# Patient Record
Sex: FEMALE | Race: WHITE | NOT HISPANIC OR LATINO | Employment: OTHER | URBAN - METROPOLITAN AREA
[De-identification: names, ages, dates, MRNs, and addresses within clinical notes are randomized per-mention and may not be internally consistent; named-entity substitution may affect disease eponyms.]

---

## 2020-05-26 ASSESSMENT — VISUAL ACUITY
OS_SC: 20/30
OD_CC: J1+
OS_CC: 20/20
OD_CC: 20/25
OS_SC: J7
OS_CC: J1+
OD_SC: J7
OD_SC: 20/50

## 2020-05-26 ASSESSMENT — TONOMETRY
OD_IOP_MMHG: 12
OS_IOP_MMHG: 13

## 2020-06-11 ENCOUNTER — ESTABLISHED PATIENT (OUTPATIENT)
Dept: URBAN - METROPOLITAN AREA CLINIC 32 | Facility: CLINIC | Age: 65
End: 2020-06-11

## 2020-06-11 DIAGNOSIS — Z96.1: ICD-10-CM

## 2020-06-11 DIAGNOSIS — H40.013: ICD-10-CM

## 2020-06-11 DIAGNOSIS — H26.493: ICD-10-CM

## 2020-06-11 PROCEDURE — 92014 COMPRE OPH EXAM EST PT 1/>: CPT

## 2020-06-11 PROCEDURE — 92310-1 LEVEL 1 CONTACT LENS MANAGEMENT

## 2020-06-11 PROCEDURE — 92015 DETERMINE REFRACTIVE STATE: CPT

## 2020-06-11 PROCEDURE — 92133 CPTRZD OPH DX IMG PST SGM ON: CPT

## 2020-06-11 ASSESSMENT — KERATOMETRY
OS_K1POWER_DIOPTERS: 43
OS_K2POWER_DIOPTERS: 43.75
OD_K1POWER_DIOPTERS: 42
OS_AXISANGLE2_DEGREES: 60
OD_AXISANGLE2_DEGREES: 88
OD_K2POWER_DIOPTERS: 42.75
OS_AXISANGLE_DEGREES: 150
OD_AXISANGLE_DEGREES: 178

## 2020-07-24 ENCOUNTER — OFFICE VISIT (OUTPATIENT)
Dept: URBAN - METROPOLITAN AREA CLINIC 68 | Facility: CLINIC | Age: 65
End: 2020-07-24

## 2020-07-28 ENCOUNTER — TELEPHONE ENCOUNTER (OUTPATIENT)
Dept: URBAN - METROPOLITAN AREA CLINIC 68 | Facility: CLINIC | Age: 65
End: 2020-07-28

## 2020-07-29 ENCOUNTER — TELEPHONE ENCOUNTER (OUTPATIENT)
Dept: URBAN - METROPOLITAN AREA CLINIC 68 | Facility: CLINIC | Age: 65
End: 2020-07-29

## 2020-08-24 ENCOUNTER — GLASSES RECHECK (OUTPATIENT)
Dept: URBAN - METROPOLITAN AREA CLINIC 32 | Facility: CLINIC | Age: 65
End: 2020-08-24

## 2020-08-24 ENCOUNTER — OFFICE VISIT (OUTPATIENT)
Dept: URBAN - METROPOLITAN AREA SURGERY CENTER 12 | Facility: SURGERY CENTER | Age: 65
End: 2020-08-24

## 2020-08-24 DIAGNOSIS — H40.013: ICD-10-CM

## 2020-08-24 PROCEDURE — 92015NC REFRACTION NO CHARGE

## 2020-08-24 ASSESSMENT — KERATOMETRY
OD_AXISANGLE2_DEGREES: 88
OS_K2POWER_DIOPTERS: 43.75
OS_AXISANGLE_DEGREES: 150
OD_K2POWER_DIOPTERS: 42.75
OD_K1POWER_DIOPTERS: 42
OD_AXISANGLE_DEGREES: 178
OS_AXISANGLE2_DEGREES: 60
OS_K1POWER_DIOPTERS: 43

## 2020-08-24 ASSESSMENT — VISUAL ACUITY
OS_SC: 20/30+2
OS_SC: J7-1
OD_SC: J5-1
OD_CC: J2+2
OS_CC: J1
OD_SC: 20/30+1

## 2020-08-25 ENCOUNTER — LAB OUTSIDE AN ENCOUNTER (OUTPATIENT)
Dept: URBAN - METROPOLITAN AREA CLINIC 68 | Facility: CLINIC | Age: 65
End: 2020-08-25

## 2020-08-25 ENCOUNTER — TELEPHONE ENCOUNTER (OUTPATIENT)
Dept: URBAN - METROPOLITAN AREA CLINIC 68 | Facility: CLINIC | Age: 65
End: 2020-08-25

## 2020-08-25 LAB
01: (no result)
01: (no result)

## 2020-09-21 ENCOUNTER — TELEPHONE ENCOUNTER (OUTPATIENT)
Dept: URBAN - METROPOLITAN AREA CLINIC 68 | Facility: CLINIC | Age: 65
End: 2020-09-21

## 2020-12-07 ENCOUNTER — EST. PATIENT EMERGENCY (OUTPATIENT)
Dept: URBAN - METROPOLITAN AREA CLINIC 32 | Facility: CLINIC | Age: 65
End: 2020-12-07

## 2020-12-07 DIAGNOSIS — H04.123: ICD-10-CM

## 2020-12-07 PROCEDURE — 92012 INTRM OPH EXAM EST PATIENT: CPT

## 2020-12-07 ASSESSMENT — KERATOMETRY
OS_K2POWER_DIOPTERS: 43.75
OD_AXISANGLE2_DEGREES: 88
OD_K2POWER_DIOPTERS: 42.75
OS_AXISANGLE2_DEGREES: 60
OD_K1POWER_DIOPTERS: 42
OS_AXISANGLE_DEGREES: 150
OS_K1POWER_DIOPTERS: 43
OD_AXISANGLE_DEGREES: 178

## 2020-12-07 ASSESSMENT — VISUAL ACUITY
OS_CC: 20/20
OD_CC: 20/20-2

## 2020-12-07 ASSESSMENT — TONOMETRY
OD_IOP_MMHG: 13
OS_IOP_MMHG: 13

## 2020-12-14 ENCOUNTER — CONTACT LENS EXAM ESTABLISHED (OUTPATIENT)
Dept: URBAN - METROPOLITAN AREA CLINIC 32 | Facility: CLINIC | Age: 65
End: 2020-12-14

## 2020-12-14 DIAGNOSIS — H04.123: ICD-10-CM

## 2020-12-14 PROCEDURE — 92310-2 LEVEL 2 CONTACT LENS MANAGEMENT

## 2020-12-14 ASSESSMENT — KERATOMETRY
OS_K2POWER_DIOPTERS: 43.75
OD_K1POWER_DIOPTERS: 42
OD_AXISANGLE2_DEGREES: 88
OS_AXISANGLE2_DEGREES: 60
OD_AXISANGLE_DEGREES: 178
OS_AXISANGLE_DEGREES: 150
OS_K1POWER_DIOPTERS: 43
OD_K2POWER_DIOPTERS: 42.75

## 2021-07-22 ENCOUNTER — CONTACT LENS EXAM ESTABLISHED (OUTPATIENT)
Dept: URBAN - METROPOLITAN AREA CLINIC 32 | Facility: CLINIC | Age: 66
End: 2021-07-22

## 2021-07-22 DIAGNOSIS — H04.123: ICD-10-CM

## 2021-07-22 DIAGNOSIS — E10.9: ICD-10-CM

## 2021-07-22 DIAGNOSIS — H40.013: ICD-10-CM

## 2021-07-22 DIAGNOSIS — H26.493: ICD-10-CM

## 2021-07-22 DIAGNOSIS — Z96.1: ICD-10-CM

## 2021-07-22 DIAGNOSIS — Z46.0: ICD-10-CM

## 2021-07-22 PROCEDURE — 92015 DETERMINE REFRACTIVE STATE: CPT

## 2021-07-22 PROCEDURE — 92310-3 LEVEL 3 CONTACT LENS MANAGEMENT

## 2021-07-22 PROCEDURE — 92014 COMPRE OPH EXAM EST PT 1/>: CPT

## 2021-07-22 PROCEDURE — 92250 FUNDUS PHOTOGRAPHY W/I&R: CPT

## 2021-07-22 ASSESSMENT — VISUAL ACUITY
OS_SC: J7
OD_CC: J1+
OD_SC: J7
OS_SC: 20/30
OS_CC: 20/20
OS_CC: J1+
OD_SC: 20/30
OD_CC: 20/40

## 2021-07-22 ASSESSMENT — KERATOMETRY
OS_AXISANGLE_DEGREES: 150
OS_K2POWER_DIOPTERS: 43.75
OD_AXISANGLE_DEGREES: 178
OD_AXISANGLE2_DEGREES: 88
OD_K1POWER_DIOPTERS: 42
OD_K2POWER_DIOPTERS: 42.75
OS_K1POWER_DIOPTERS: 43
OS_AXISANGLE2_DEGREES: 60

## 2021-07-22 ASSESSMENT — TONOMETRY
OD_IOP_MMHG: 12
OS_IOP_MMHG: 11

## 2021-09-03 ENCOUNTER — APPOINTMENT (RX ONLY)
Dept: URBAN - METROPOLITAN AREA CLINIC 126 | Facility: CLINIC | Age: 66
Setting detail: DERMATOLOGY
End: 2021-09-03

## 2021-09-03 DIAGNOSIS — L81.4 OTHER MELANIN HYPERPIGMENTATION: ICD-10-CM

## 2021-09-03 DIAGNOSIS — L663 OTHER SPECIFIED DISEASES OF HAIR AND HAIR FOLLICLES: ICD-10-CM

## 2021-09-03 DIAGNOSIS — L73.9 FOLLICULAR DISORDER, UNSPECIFIED: ICD-10-CM

## 2021-09-03 DIAGNOSIS — D49.2 NEOPLASM OF UNSPECIFIED BEHAVIOR OF BONE, SOFT TISSUE, AND SKIN: ICD-10-CM

## 2021-09-03 DIAGNOSIS — K13.0 DISEASES OF LIPS: ICD-10-CM

## 2021-09-03 DIAGNOSIS — Z71.89 OTHER SPECIFIED COUNSELING: ICD-10-CM

## 2021-09-03 DIAGNOSIS — L57.0 ACTINIC KERATOSIS: ICD-10-CM

## 2021-09-03 DIAGNOSIS — L738 OTHER SPECIFIED DISEASES OF HAIR AND HAIR FOLLICLES: ICD-10-CM

## 2021-09-03 PROBLEM — L02.426 FURUNCLE OF LEFT LOWER LIMB: Status: ACTIVE | Noted: 2021-09-03

## 2021-09-03 PROCEDURE — ? PRESCRIPTION

## 2021-09-03 PROCEDURE — ? SHAVE REMOVAL

## 2021-09-03 PROCEDURE — 99203 OFFICE O/P NEW LOW 30 MIN: CPT | Mod: 25

## 2021-09-03 PROCEDURE — 11302 SHAVE SKIN LESION 1.1-2.0 CM: CPT

## 2021-09-03 PROCEDURE — ? COUNSELING

## 2021-09-03 PROCEDURE — ? PRESCRIPTION MEDICATION MANAGEMENT

## 2021-09-03 PROCEDURE — ? LIQUID NITROGEN

## 2021-09-03 PROCEDURE — 17000 DESTRUCT PREMALG LESION: CPT | Mod: 59

## 2021-09-03 RX ORDER — KETOCONAZOLE 20 MG/G
CREAM TOPICAL BID
Qty: 15 | Refills: 3 | Status: ERX | COMMUNITY
Start: 2021-09-03

## 2021-09-03 RX ADMIN — KETOCONAZOLE: 20 CREAM TOPICAL at 00:00

## 2021-09-03 ASSESSMENT — LOCATION ZONE DERM
LOCATION ZONE: EAR
LOCATION ZONE: LEG
LOCATION ZONE: LIP
LOCATION ZONE: TRUNK

## 2021-09-03 ASSESSMENT — LOCATION SIMPLE DESCRIPTION DERM
LOCATION SIMPLE: RIGHT EAR
LOCATION SIMPLE: UPPER BACK
LOCATION SIMPLE: LEFT PRETIBIAL REGION
LOCATION SIMPLE: RIGHT LIP
LOCATION SIMPLE: LEFT THIGH

## 2021-09-03 ASSESSMENT — LOCATION DETAILED DESCRIPTION DERM
LOCATION DETAILED: RIGHT UPPER CUTANEOUS LIP
LOCATION DETAILED: LEFT ANTERIOR PROXIMAL THIGH
LOCATION DETAILED: RIGHT SUPERIOR CRUS OF ANTIHELIX
LOCATION DETAILED: INFERIOR THORACIC SPINE
LOCATION DETAILED: LEFT DISTAL PRETIBIAL REGION
LOCATION DETAILED: LEFT PROXIMAL PRETIBIAL REGION

## 2021-09-03 NOTE — PROCEDURE: LIQUID NITROGEN
Show Applicator Variable?: Yes
Post-Care Instructions: I reviewed with the patient in detail post-care instructions. Patient is to wear sunprotection, and avoid picking at any of the treated lesions. Pt may apply Vaseline to crusted or scabbing areas.
Number Of Freeze-Thaw Cycles: 3 freeze-thaw cycles
Duration Of Freeze Thaw-Cycle (Seconds): 5
Consent: The patient's consent was obtained including but not limited to risks of crusting, scabbing, blistering, scarring, darker or lighter pigmentary change, recurrence, incomplete removal and infection.
Detail Level: Simple
Render Note In Bullet Format When Appropriate: No

## 2021-09-03 NOTE — PROCEDURE: SHAVE REMOVAL
Medical Necessity Information: It is in your best interest to select a reason for this procedure from the list below. All of these items fulfill various CMS LCD requirements except the new and changing color options.
Medical Necessity Clause: This procedure was medically necessary because the lesion that was treated was:
Lab: [facility]
Body Location Override (Optional - Billing Will Still Be Based On Selected Body Map Location If Applicable): left lateral Pretibial
Detail Level: Detailed
Was A Bandage Applied: Yes
Size Of Lesion In Cm (Required): 1.1
X Size Of Lesion In Cm (Optional): 0
Biopsy Method: Dermablade
Anesthesia Type: 1% lidocaine with epinephrine
Hemostasis: Aluminum Chloride and Electrocautery
Wound Care: Vaseline
Render Path Notes In Note?: No
Consent was obtained from the patient. The risks and benefits to therapy were discussed in detail. Specifically, the risks of infection, scarring, bleeding, prolonged wound healing, incomplete removal, allergy to anesthesia, nerve injury and recurrence were addressed. Prior to the procedure, the treatment site was clearly identified and confirmed by the patient. All components of Universal Protocol/PAUSE Rule completed.
Post-Care Instructions: I reviewed with the patient in detail post-care instructions. Patient is to keep the biopsy site dry overnight, and then apply bacitracin twice daily until healed. Patient may apply hydrogen peroxide soaks to remove any crusting.
Notification Instructions: Patient will be notified of pathology results. However, patient instructed to call the office if not contacted within 2 weeks.
Billing Type: [employer]

## 2021-09-03 NOTE — HPI: SKIN LESION
How Severe Is Your Skin Lesion?: mild
Is This A New Presentation, Or A Follow-Up?: Skin Lesion
Additional History: pt.  notes lesion has been present since February.

## 2021-09-03 NOTE — PROCEDURE: MIPS QUALITY
Detail Level: Generalized
Additional Notes: pt doesnât have exact med doses
Quality 130: Documentation Of Current Medications In The Medical Record: Documentation of a medical reason(s) for not documenting, updating, or reviewing the patientâs current medications list (e.g., patient is in an urgent or emergent medical situation)

## 2021-10-13 ENCOUNTER — OFFICE VISIT (OUTPATIENT)
Dept: URBAN - METROPOLITAN AREA CLINIC 68 | Facility: CLINIC | Age: 66
End: 2021-10-13

## 2022-06-04 ENCOUNTER — TELEPHONE ENCOUNTER (OUTPATIENT)
Dept: URBAN - METROPOLITAN AREA CLINIC 68 | Facility: CLINIC | Age: 67
End: 2022-06-04

## 2022-06-04 RX ORDER — SODIUM PICOSULFATE, MAGNESIUM OXIDE, AND ANHYDROUS CITRIC ACID 10; 3.5; 12 MG/160ML; G/160ML; G/160ML
LIQUID ORAL AS DIRECTED
Qty: 1 | Refills: 0 | OUTPATIENT
Start: 2018-08-02 | End: 2018-08-03

## 2022-06-04 RX ORDER — BUDESONIDE 3 MG/1
CAPSULE, COATED PELLETS ORAL DAILY
Qty: 90 | Refills: 0 | OUTPATIENT
Start: 2018-08-16 | End: 2018-09-15

## 2022-06-04 RX ORDER — SODIUM SULFATE, POTASSIUM SULFATE, MAGNESIUM SULFATE 17.5; 3.13; 1.6 G/ML; G/ML; G/ML
SOLUTION, CONCENTRATE ORAL AS DIRECTED
Qty: 340 | Refills: 0 | OUTPATIENT
Start: 2020-07-24 | End: 2020-07-25

## 2022-06-05 ENCOUNTER — TELEPHONE ENCOUNTER (OUTPATIENT)
Dept: URBAN - METROPOLITAN AREA CLINIC 68 | Facility: CLINIC | Age: 67
End: 2022-06-05

## 2022-06-05 RX ORDER — INSULIN LISPRO 100 [IU]/ML
HUMALOG( 100UNIT/ML SUBCUTANEOUS  DAILY ) ACTIVE -HX ENTRY INJECTION, SOLUTION INTRAVENOUS; SUBCUTANEOUS DAILY
Status: ACTIVE | COMMUNITY
Start: 2021-10-13

## 2022-06-05 RX ORDER — INSULIN GLARGINE 100 [IU]/ML
LANTUS( 100UNIT/ML SUBCUTANEOUS  DAILY ) ACTIVE -HX ENTRY INJECTION, SOLUTION SUBCUTANEOUS DAILY
Status: ACTIVE | COMMUNITY
Start: 2021-10-13

## 2022-06-05 RX ORDER — BUDESONIDE 3 MG/1
CAPSULE, COATED PELLETS ORAL DAILY
Qty: 90 | Refills: 90 | Status: ACTIVE | COMMUNITY
Start: 2020-07-24

## 2022-06-05 RX ORDER — BUDESONIDE 3 MG/1
CAPSULE, COATED PELLETS ORAL DAILY
Qty: 90 | Refills: 0 | Status: ACTIVE | COMMUNITY
Start: 2021-10-13

## 2022-06-25 ENCOUNTER — TELEPHONE ENCOUNTER (OUTPATIENT)
Age: 67
End: 2022-06-25

## 2022-06-25 RX ORDER — SODIUM PICOSULFATE, MAGNESIUM OXIDE, AND ANHYDROUS CITRIC ACID 10; 3.5; 12 MG/160ML; G/160ML; G/160ML
LIQUID ORAL AS DIRECTED
Qty: 1 | Refills: 0 | OUTPATIENT
Start: 2018-08-02 | End: 2018-08-03

## 2022-06-25 RX ORDER — BUDESONIDE 3 MG/1
CAPSULE, COATED PELLETS ORAL DAILY
Qty: 90 | Refills: 0 | OUTPATIENT
Start: 2018-08-16 | End: 2018-09-15

## 2022-06-25 RX ORDER — SODIUM SULFATE, POTASSIUM SULFATE, MAGNESIUM SULFATE 17.5; 3.13; 1.6 G/ML; G/ML; G/ML
SOLUTION, CONCENTRATE ORAL AS DIRECTED
Qty: 1 | Refills: 0 | OUTPATIENT
Start: 2016-10-12 | End: 2016-10-13

## 2022-06-25 RX ORDER — SODIUM SULFATE, POTASSIUM SULFATE, MAGNESIUM SULFATE 17.5; 3.13; 1.6 G/ML; G/ML; G/ML
SOLUTION, CONCENTRATE ORAL AS DIRECTED
Qty: 340 | Refills: 0 | OUTPATIENT
Start: 2020-07-24 | End: 2020-07-25

## 2022-06-26 ENCOUNTER — TELEPHONE ENCOUNTER (OUTPATIENT)
Age: 67
End: 2022-06-26

## 2022-06-26 RX ORDER — BUDESONIDE 3 MG/1
CAPSULE, COATED PELLETS ORAL DAILY
Qty: 90 | Refills: 90 | Status: ACTIVE | COMMUNITY
Start: 2020-07-24

## 2022-06-26 RX ORDER — INSULIN LISPRO 100 [IU]/ML
HUMALOG( 100UNIT/ML SUBCUTANEOUS  DAILY ) ACTIVE -HX ENTRY INJECTION, SOLUTION INTRAVENOUS; SUBCUTANEOUS DAILY
Status: ACTIVE | COMMUNITY
Start: 2021-10-13

## 2022-06-26 RX ORDER — INSULIN GLARGINE 100 [IU]/ML
LANTUS( 100UNIT/ML SUBCUTANEOUS  DAILY ) ACTIVE -HX ENTRY INJECTION, SOLUTION SUBCUTANEOUS DAILY
Status: ACTIVE | COMMUNITY
Start: 2021-10-13

## 2022-06-26 RX ORDER — BUDESONIDE 3 MG/1
CAPSULE, COATED PELLETS ORAL DAILY
Qty: 90 | Refills: 0 | Status: ACTIVE | COMMUNITY
Start: 2021-10-13

## 2022-08-01 ENCOUNTER — COMPREHENSIVE EXAM (OUTPATIENT)
Dept: URBAN - METROPOLITAN AREA CLINIC 32 | Facility: CLINIC | Age: 67
End: 2022-08-01

## 2022-08-01 DIAGNOSIS — Z96.1: ICD-10-CM

## 2022-08-01 DIAGNOSIS — H26.493: ICD-10-CM

## 2022-08-01 DIAGNOSIS — H04.123: ICD-10-CM

## 2022-08-01 DIAGNOSIS — H40.013: ICD-10-CM

## 2022-08-01 PROCEDURE — 92014 COMPRE OPH EXAM EST PT 1/>: CPT

## 2022-08-01 PROCEDURE — 92310-1 LEVEL 1 CONTACT LENS MANAGEMENT

## 2022-08-01 PROCEDURE — 92133 CPTRZD OPH DX IMG PST SGM ON: CPT

## 2022-08-01 ASSESSMENT — VISUAL ACUITY
OD_SC: J7
OD_SC: 20/30
OS_SC: J7
OS_SC: 20/30

## 2022-08-01 ASSESSMENT — TONOMETRY
OS_IOP_MMHG: 13
OD_IOP_MMHG: 12

## 2022-08-01 ASSESSMENT — KERATOMETRY
OS_K1POWER_DIOPTERS: 43
OS_AXISANGLE_DEGREES: 150
OS_K2POWER_DIOPTERS: 43.75
OD_K2POWER_DIOPTERS: 42.75
OD_AXISANGLE_DEGREES: 178
OD_K1POWER_DIOPTERS: 42
OS_AXISANGLE2_DEGREES: 60
OD_AXISANGLE2_DEGREES: 88

## 2023-04-24 ENCOUNTER — EMERGENCY VISIT (OUTPATIENT)
Dept: URBAN - METROPOLITAN AREA CLINIC 32 | Facility: CLINIC | Age: 68
End: 2023-04-24

## 2023-04-24 DIAGNOSIS — H04.222: ICD-10-CM

## 2023-04-24 PROCEDURE — 92012 INTRM OPH EXAM EST PATIENT: CPT

## 2023-04-24 PROCEDURE — 68801 DILATE TEAR DUCT OPENING: CPT

## 2023-04-24 ASSESSMENT — KERATOMETRY
OD_AXISANGLE_DEGREES: 178
OS_AXISANGLE2_DEGREES: 60
OD_AXISANGLE2_DEGREES: 88
OS_AXISANGLE_DEGREES: 150
OS_K1POWER_DIOPTERS: 43
OD_K1POWER_DIOPTERS: 42
OD_K2POWER_DIOPTERS: 42.75
OS_K2POWER_DIOPTERS: 43.75

## 2023-04-24 ASSESSMENT — VISUAL ACUITY
OS_CC: J1+
OS_CC: 20/20-2
OD_PH: 20/40-2
OD_CC: 20/200
OD_CC: J5-1

## 2023-04-24 ASSESSMENT — TONOMETRY
OS_IOP_MMHG: 13
OD_IOP_MMHG: 10

## 2023-07-13 ENCOUNTER — EMERGENCY VISIT (OUTPATIENT)
Dept: URBAN - METROPOLITAN AREA CLINIC 32 | Facility: CLINIC | Age: 68
End: 2023-07-13

## 2023-07-13 DIAGNOSIS — H04.222: ICD-10-CM

## 2023-07-13 DIAGNOSIS — H04.123: ICD-10-CM

## 2023-07-13 PROCEDURE — 99214 OFFICE O/P EST MOD 30 MIN: CPT

## 2023-07-13 ASSESSMENT — TONOMETRY
OS_IOP_MMHG: 10
OD_IOP_MMHG: 11

## 2023-07-13 ASSESSMENT — KERATOMETRY
OS_K1POWER_DIOPTERS: 43
OD_AXISANGLE_DEGREES: 178
OD_K2POWER_DIOPTERS: 42.75
OD_K1POWER_DIOPTERS: 42
OS_AXISANGLE2_DEGREES: 60
OS_K2POWER_DIOPTERS: 43.75
OD_AXISANGLE2_DEGREES: 88
OS_AXISANGLE_DEGREES: 150

## 2023-07-13 ASSESSMENT — VISUAL ACUITY
OS_CC: 20/25
OD_CC: 20/60

## 2023-08-07 ENCOUNTER — CONTACT LENSES/GLASSES VISIT (OUTPATIENT)
Dept: URBAN - METROPOLITAN AREA CLINIC 32 | Facility: CLINIC | Age: 68
End: 2023-08-07

## 2023-08-07 DIAGNOSIS — H04.222: ICD-10-CM

## 2023-08-07 DIAGNOSIS — H04.123: ICD-10-CM

## 2023-08-07 PROCEDURE — 92310-2 LEVEL 2 CONTACT LENS MANAGEMENT

## 2023-08-07 ASSESSMENT — KERATOMETRY
OD_K1POWER_DIOPTERS: 42
OS_K2POWER_DIOPTERS: 43.75
OD_AXISANGLE2_DEGREES: 88
OD_K2POWER_DIOPTERS: 42.75
OD_AXISANGLE_DEGREES: 178
OS_K1POWER_DIOPTERS: 43
OS_AXISANGLE_DEGREES: 150
OS_AXISANGLE2_DEGREES: 60

## 2024-07-26 ASSESSMENT — KERATOMETRY
OD_K1POWER_DIOPTERS: 42
OD_K2POWER_DIOPTERS: 42.75
OD_AXISANGLE2_DEGREES: 88
OS_AXISANGLE2_DEGREES: 60
OS_K1POWER_DIOPTERS: 43
OD_AXISANGLE_DEGREES: 178
OS_AXISANGLE_DEGREES: 150
OS_K2POWER_DIOPTERS: 43.75

## 2024-07-29 ENCOUNTER — COMPREHENSIVE EXAM (OUTPATIENT)
Dept: URBAN - METROPOLITAN AREA CLINIC 32 | Facility: CLINIC | Age: 69
End: 2024-07-29

## 2024-07-29 DIAGNOSIS — H04.123: ICD-10-CM

## 2024-07-29 DIAGNOSIS — E10.9: ICD-10-CM

## 2024-07-29 DIAGNOSIS — H04.222: ICD-10-CM

## 2024-07-29 DIAGNOSIS — H43.811: ICD-10-CM

## 2024-07-29 DIAGNOSIS — H26.493: ICD-10-CM

## 2024-07-29 DIAGNOSIS — H40.013: ICD-10-CM

## 2024-07-29 PROCEDURE — 92133 CPTRZD OPH DX IMG PST SGM ON: CPT

## 2024-07-29 PROCEDURE — 92310-1 LEVEL 1 CONTACT LENS MANAGEMENT

## 2024-07-29 PROCEDURE — 99214 OFFICE O/P EST MOD 30 MIN: CPT

## 2024-07-29 ASSESSMENT — VISUAL ACUITY
OD_CC: 20/20
OS_CC: 20/25
OS_SC: J7
OD_SC: 20/30
OS_CC: J1
OS_SC: 20/30
OD_SC: J7
OD_CC: J1

## 2024-07-29 ASSESSMENT — TONOMETRY
OD_IOP_MMHG: 14
OS_IOP_MMHG: 13

## 2024-08-16 ENCOUNTER — CONTACT LENSES/GLASSES VISIT (OUTPATIENT)
Dept: URBAN - METROPOLITAN AREA CLINIC 32 | Facility: CLINIC | Age: 69
End: 2024-08-16

## 2024-08-16 DIAGNOSIS — E10.9: ICD-10-CM

## 2024-08-16 DIAGNOSIS — H43.811: ICD-10-CM

## 2024-08-16 DIAGNOSIS — H04.222: ICD-10-CM

## 2024-08-16 DIAGNOSIS — H40.013: ICD-10-CM

## 2024-08-16 DIAGNOSIS — H04.123: ICD-10-CM

## 2024-08-16 DIAGNOSIS — H26.493: ICD-10-CM

## 2024-08-16 PROCEDURE — 92310F

## 2024-08-16 ASSESSMENT — KERATOMETRY
OD_K2POWER_DIOPTERS: 42.75
OS_K2POWER_DIOPTERS: 43.75
OS_K1POWER_DIOPTERS: 43
OD_K1POWER_DIOPTERS: 42
OS_AXISANGLE_DEGREES: 150
OD_AXISANGLE_DEGREES: 178
OD_AXISANGLE2_DEGREES: 88
OS_AXISANGLE2_DEGREES: 60